# Patient Record
(demographics unavailable — no encounter records)

---

## 2024-12-03 NOTE — PROCEDURE
[Anterior rhinoscopy insufficient to account for symptoms] : anterior rhinoscopy insufficient to account for symptoms [Flexible Endoscope] : examined with the flexible endoscope [Congested] : congested [Deviated to the Lt] : deviated to the left [Normal] : the paranasal sinuses had no abnormalities [Adenoids Present] : the adenoids were absent [FreeTextEntry6] : done to eval for omu patency, npx mass findings: very large tonsils and DNS, blood clot noted to anterior inferior turbinate

## 2024-12-03 NOTE — PHYSICAL EXAM
[Nasal Endoscopy Performed] : nasal endoscopy was performed, see procedure section for findings [] : septum deviated to the left [Midline] : trachea located in midline position [Normal] : assessment of respiratory effort is normal [de-identified] : turbinate hypertrophy [de-identified] : 3+ tonsil R>L

## 2024-12-03 NOTE — END OF VISIT
[FreeTextEntry3] :  I, Dr. Recinos, personally performed the evaluation and management (E/M) services for this new patient.  That E/M includes conducting the initial examination, assessing all conditions, and establishing the plan of care.  Today, my physician assistant, Tanya Rubi, was here to observe my evaluation and management services for this patient to be followed going forward.

## 2024-12-03 NOTE — HISTORY OF PRESENT ILLNESS
[de-identified] : 28 y/o M p/w chronic nasal congestion for several years L>R. Patient reports snoring, dry mouth in the AM.  He occasionally has rhinorrhea (clear) He occasionally wakes up from his snores. He reports h/o seasonal allergies, allergy to cats/dogs. He does not have pets at home. He occasionally has nose bleeds. He reports potentially fx nose x7-8 years ago ([place] [employer] [place]).   OTC: nasal spray with no relief. He denies recurrent sinusitis, sinus headaches/sinus pressure, recurrent tonsillitis.

## 2025-01-30 NOTE — ASSESSMENT
[FreeTextEntry1] : 1. SHIVANI -moderate sleep study (AHI=17.9%) -results reviewed with patient -plan for UPPP, open SMR/septoplasty, turbinate reduction at [facility] -risks/benefits/alternatives discussed -patient wished to proceed -patient brought to

## 2025-01-30 NOTE — REASON FOR VISIT
[Subsequent Evaluation] : a subsequent evaluation for [FreeTextEntry2] : sleep study discuss for UPPP and open SMR turbs

## 2025-01-30 NOTE — HISTORY OF PRESENT ILLNESS
[de-identified] : 1 month follow up visit for this 30 y/o M here to discuss sleep study for eval of sleep apnea. During last visit, he was noted to have DNS and tonsillar hypertrophy.

## 2025-01-30 NOTE — ASSESSMENT
[FreeTextEntry1] : 1. SHIVANI -moderate sleep study (AHI=17.9%) -results reviewed with patient -plan for UPPP, open SMR/septoplasty, turbinate reduction at [facility] -risks/benefits/alternatives discussed -patient wished to proceed -patient brought to

## 2025-03-16 NOTE — HISTORY OF PRESENT ILLNESS
[No Pertinent Cardiac History] : no history of aortic stenosis, atrial fibrillation, coronary artery disease, recent myocardial infarction, or implantable device/pacemaker [No Pertinent Pulmonary History] : no history of asthma, COPD, sleep apnea, or smoking [No Adverse Anesthesia Reaction] : no adverse anesthesia reaction in self or family member [Chronic Anticoagulation] : no chronic anticoagulation [Chronic Kidney Disease] : no chronic kidney disease [Diabetes] : no diabetes [(Patient denies any chest pain, claudication, dyspnea on exertion, orthopnea, palpitations or syncope)] : Patient denies any chest pain, claudication, dyspnea on exertion, orthopnea, palpitations or syncope [Good (7-10 METs)] : Good (7-10 METs) [FreeTextEntry1] : 4/10/25 [FreeTextEntry2] : septoplasty [FreeTextEntry3] : Dr. Wisam Recinos [FreeTextEntry4] :  patient here for pre-op clearance

## 2025-04-03 NOTE — HISTORY OF PRESENT ILLNESS
[FreeTextEntry1] :  patient here for yearly physical exam [de-identified] :  patient here for yearly physical exam

## 2025-04-03 NOTE — HEALTH RISK ASSESSMENT
[Yes] : Yes [Never (0 pts)] : Never (0 points) [No] : In the past 12 months have you used drugs other than those required for medical reasons? No [Little interest or pleasure doing things] : 1) Little interest or pleasure doing things [Feeling down, depressed, or hopeless] : 2) Feeling down, depressed, or hopeless [0] : 2) Feeling down, depressed, or hopeless: Not at all (0) [Time Spent: ___ Minutes] : I spent [unfilled] minutes performing a depression screening for this patient. [Never] : Never [HIV Test offered] : HIV Test offered [Hepatitis C test offered] : Hepatitis C test offered [With Significant Other] : lives with significant other [Employed] : employed [] :  [Sexually Active] : sexually active [Excellent] : ~his/her~  mood as  excellent [No falls in past year] : Patient reported no falls in the past year [NO] : No [Feels Safe at Home] : Feels safe at home [Fully functional (bathing, dressing, toileting, transferring, walking, feeding)] : Fully functional (bathing, dressing, toileting, transferring, walking, feeding) [Fully functional (using the telephone, shopping, preparing meals, housekeeping, doing laundry, using] : Fully functional and needs no help or supervision to perform IADLs (using the telephone, shopping, preparing meals, housekeeping, doing laundry, using transportation, managing medications and managing finances) [Reports normal functional visual acuity (ie: able to read med bottle)] : Reports normal functional visual acuity [Change in mental status noted] : No change in mental status noted [Language] : denies difficulty with language [Behavior] : denies difficulty with behavior [Learning/Retaining New Information] : denies difficulty learning/retaining new information [Handling Complex Tasks] : denies difficulty handling complex tasks [Reasoning] : denies difficulty with reasoning [Spatial Ability and Orientation] : denies difficulty with spatial ability and orientation [High Risk Behavior] : no high risk behavior [Reports changes in hearing] : Reports no changes in hearing [Reports changes in vision] : Reports no changes in vision [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

## 2025-04-16 NOTE — PHYSICAL EXAM
[Normal] : mucosa is normal [Midline] : trachea located in midline position [de-identified] : post turbinate reduction, healing well [de-identified] : typical secretions noted, debridement performed atraumatically with suction [de-identified] : post UPPP, healing well

## 2025-04-16 NOTE — ASSESSMENT
[FreeTextEntry1] : 1. SHIVANI, post op check -1 week post op UPPP, open SMR/septoplasty, turbinate reduction -debridement performed with no complications -continue saline rinses -RTC in x2-3 weeks -hydrate

## 2025-04-16 NOTE — HISTORY OF PRESENT ILLNESS
awaiting CT [de-identified] :  1 week PO visit for this 30yo M s/p UPPP, open SMR/septoplasty, turbinate reduction on 4/10/25 for tx of SHIVANI.  Denies fever, otalgia. Currently on soft foods, able to tolerate PO. Reports improvement of nasal congestion, noted some swelling to L nostril.

## 2025-04-16 NOTE — PHYSICAL EXAM
[Normal] : mucosa is normal [Midline] : trachea located in midline position [de-identified] : post turbinate reduction, healing well [de-identified] : typical secretions noted, debridement performed atraumatically with suction [de-identified] : post UPPP, healing well

## 2025-04-16 NOTE — HISTORY OF PRESENT ILLNESS
[de-identified] :  1 week PO visit for this 30yo M s/p UPPP, open SMR/septoplasty, turbinate reduction on 4/10/25 for tx of SHIVANI.  Denies fever, otalgia. Currently on soft foods, able to tolerate PO. Reports improvement of nasal congestion, noted some swelling to L nostril.

## 2025-06-18 NOTE — HISTORY OF PRESENT ILLNESS
[de-identified] : 1 month follow up visit for this 28 y/o M here to discuss sleep study for eval of sleep apnea. During last visit, he was noted to have DNS and tonsillar hypertrophy. good balance